# Patient Record
Sex: MALE | Race: WHITE | NOT HISPANIC OR LATINO | Employment: FULL TIME | URBAN - METROPOLITAN AREA
[De-identification: names, ages, dates, MRNs, and addresses within clinical notes are randomized per-mention and may not be internally consistent; named-entity substitution may affect disease eponyms.]

---

## 2023-03-28 ENCOUNTER — OFFICE VISIT (OUTPATIENT)
Dept: GASTROENTEROLOGY | Facility: CLINIC | Age: 23
End: 2023-03-28

## 2023-03-28 VITALS
WEIGHT: 246 LBS | HEIGHT: 74 IN | BODY MASS INDEX: 31.57 KG/M2 | SYSTOLIC BLOOD PRESSURE: 124 MMHG | DIASTOLIC BLOOD PRESSURE: 70 MMHG | TEMPERATURE: 97.8 F

## 2023-03-28 DIAGNOSIS — R22.2 SUBCUTANEOUS NODULE OF ABDOMINAL WALL: Primary | ICD-10-CM

## 2023-03-28 DIAGNOSIS — K21.9 GASTROESOPHAGEAL REFLUX DISEASE, UNSPECIFIED WHETHER ESOPHAGITIS PRESENT: ICD-10-CM

## 2023-03-28 DIAGNOSIS — R14.0 ABDOMINAL BLOATING: ICD-10-CM

## 2023-03-28 RX ORDER — OMEPRAZOLE 40 MG/1
40 CAPSULE, DELAYED RELEASE ORAL DAILY
Qty: 30 CAPSULE | Refills: 2 | Status: SHIPPED | OUTPATIENT
Start: 2023-03-28

## 2023-03-28 NOTE — PROGRESS NOTES
[facility] - Outpatient Consultation  [facility] 23 y o  male MRN: [id number]  Encounter: [id number]      Assessment and Plan    1  GERD  2  Abdominal bloating/belching/flatulance  The patient states that for approximately 6 months he has been having frequent acid reflux as well as abdominal bloating, belching, and flatulence  He is cut out certain food triggers but has continued symptoms   -Obtain CBC, CMP, celiac serologies, and H  pylori stool antigen  -Recommend starting an antireflux diet, handout provided at his visit today  -Recommend omeprazole 40 mg every morning after submitting H  pylori stool antigen  -If the above is ineffective can consider EGD    3  Subcutaneous nodule   The patient states that he went from a sedentary job to a very physically active job and when he did so he lost 100 pounds  After losing 100 pounds he noticed that he had a nodule in his left lower quadrant which she believes is a hernia  -Physical exam reveals a small oval subcutaneous nodule in his left lower quadrant possibly lymph node vs lipoma, will obtain a CT scan for further evaluation     Follow up 3 months     ______________________________________________________________________    History of Present Illness  [facility] is a 23 y o  male here for consultation of a possible hernia  The patient states that he went from a sedentary job to a very physically active job and when he did so he lost 100 pounds  After losing 100 pounds he noticed that he had a nodule in his left lower quadrant which she believes is a hernia  In addition to this for the past 6 months he has been having a new onset acid reflux, abdominal bloating, belching, and flatulence  No other associated GI symptoms or complaints  Review of Systems   Constitutional: Negative for activity change, appetite change, chills, fatigue, fever and unexpected weight change     Gastrointestinal: Negative for abdominal distention, abdominal pain, anal bleeding, blood in stool, constipation, diarrhea, nausea, rectal pain and vomiting  Musculoskeletal: Negative for back pain  Psychiatric/Behavioral: Negative for confusion  Past Medical History  History reviewed  No pertinent past medical history  Past Social history  Past Surgical History:   Procedure Laterality Date   • HYDROCELE EXCISION / REPAIR       Social History     Socioeconomic History   • Marital status: Single     Spouse name: Not on file   • Number of children: Not on file   • Years of education: Not on file   • Highest education level: Not on file   Occupational History   • Not on file   Tobacco Use   • Smoking status: Never   • Smokeless tobacco: Never   Vaping Use   • Vaping Use: Every day   Substance and Sexual Activity   • Alcohol use: Not Currently   • Drug use: Not Currently   • Sexual activity: Not Currently   Other Topics Concern   • Not on file   Social History Narrative   • Not on file     Social Determinants of Health     Financial Resource Strain: Not on file   Food Insecurity: Not on file   Transportation Needs: Not on file   Physical Activity: Not on file   Stress: Not on file   Social Connections: Not on file   Intimate Partner Violence: Not on file   Housing Stability: Not on file     Social History     Substance and Sexual Activity   Alcohol Use Not Currently     Social History     Substance and Sexual Activity   Drug Use Not Currently     Social History     Tobacco Use   Smoking Status Never   Smokeless Tobacco Never       Past Family History  History reviewed  No pertinent family history  Current Medications  No current outpatient medications on file  No current facility-administered medications for this visit         Allergies  No Known Allergies      The following portions of the patient's history were reviewed and updated as appropriate: allergies, current medications, past medical history, past social history, past surgical "history and problem list       Vitals  Vitals:    03/28/23 1257   BP: 124/70   BP Location: Right arm   Patient Position: Sitting   Cuff Size: Adult   Temp: 97 8 °F (36 6 °C)   TempSrc: Tympanic   Weight: 112 kg (246 lb)   Height: 6' 2\" (1 88 m)         Physical Exam  Constitutional   General appearance: Patient is seated and in no acute distress, well appearing and well nourished  Head and Face   Head and face: Normal     Eyes   Conjunctiva and lids: No erythema, swelling or discharge  Anicteric  Ears, Nose, Mouth, and Throat   Hearing: Normal     Neck: Supple, trachea midline  Pulmonary   Respiratory effort: No increased work of breathing or signs of respiratory distress  Cardiovascular   Examination of extremities for edema and/or varicosities: Normal     Abdomen   Abdomen: Small oval subcutaneous nodule in the left lower quadrant  Musculoskeletal   Gait and station: Normal   Skin   Skin and subcutaneous tissue: Warm, dry, and intact  No visible jaundice, lesions or rashes  Psychiatric   Judgment and insight: Normal  Recent and remote memory:  Normal  Mood and affect: Normal      Results  No visits with results within 1 Day(s) from this visit  Latest known visit with results is:   No results found for any previous visit  Radiology Results  No results found  Orders  No orders of the defined types were placed in this encounter        Answers for HPI/ROS submitted by the patient on 3/27/2023  Chronicity: chronic  Onset: more than 1 year ago  Onset quality: undetermined  Frequency: every several days  Episode duration: 2 Hours  Progression since onset: unchanged  Pain location: LLQ  Pain - numeric: 3/10  Pain quality: aching, cramping, sharp, tearing  belching: Yes  Aggravated by: certain positions, movement  Relieved by: being still, bowel movements, palpation, standing      "